# Patient Record
Sex: MALE | Race: ASIAN | NOT HISPANIC OR LATINO
[De-identification: names, ages, dates, MRNs, and addresses within clinical notes are randomized per-mention and may not be internally consistent; named-entity substitution may affect disease eponyms.]

---

## 2018-02-22 ENCOUNTER — CHARTING TRANS (OUTPATIENT)
Dept: OTHER | Age: 48
End: 2018-02-22

## 2018-02-22 ENCOUNTER — IMAGING SERVICES (OUTPATIENT)
Dept: OTHER | Age: 48
End: 2018-02-22

## 2018-02-22 ASSESSMENT — PAIN SCALES - GENERAL: PAINLEVEL_OUTOF10: 4

## 2018-02-23 ENCOUNTER — CHARTING TRANS (OUTPATIENT)
Dept: OTHER | Age: 48
End: 2018-02-23

## 2018-02-26 ENCOUNTER — CHARTING TRANS (OUTPATIENT)
Dept: OTHER | Age: 48
End: 2018-02-26

## 2018-03-16 ENCOUNTER — CHARTING TRANS (OUTPATIENT)
Dept: OTHER | Age: 48
End: 2018-03-16

## 2018-03-30 ENCOUNTER — CHARTING TRANS (OUTPATIENT)
Dept: OTHER | Age: 48
End: 2018-03-30

## 2018-03-30 ENCOUNTER — IMAGING SERVICES (OUTPATIENT)
Dept: OTHER | Age: 48
End: 2018-03-30

## 2018-04-27 ENCOUNTER — IMAGING SERVICES (OUTPATIENT)
Dept: OTHER | Age: 48
End: 2018-04-27

## 2018-04-27 ENCOUNTER — CHARTING TRANS (OUTPATIENT)
Dept: OTHER | Age: 48
End: 2018-04-27

## 2018-06-20 ENCOUNTER — ANCILLARY ORDERS (OUTPATIENT)
Dept: OTHER | Age: 48
End: 2018-06-20

## 2018-06-20 DIAGNOSIS — S92.351D DISPLACED FRACTURE OF FIFTH METATARSAL BONE OF RIGHT FOOT WITH ROUTINE HEALING: ICD-10-CM

## 2019-03-06 VITALS — HEIGHT: 67 IN | BODY MASS INDEX: 31.39 KG/M2 | WEIGHT: 200 LBS

## 2019-03-06 VITALS
DIASTOLIC BLOOD PRESSURE: 76 MMHG | OXYGEN SATURATION: 99 % | TEMPERATURE: 98.4 F | HEART RATE: 78 BPM | SYSTOLIC BLOOD PRESSURE: 124 MMHG | RESPIRATION RATE: 20 BRPM

## 2019-11-14 ENCOUNTER — APPOINTMENT (OUTPATIENT)
Dept: URGENT CARE | Age: 49
End: 2019-11-14

## 2024-09-06 ENCOUNTER — OFFICE VISIT (OUTPATIENT)
Dept: SURGERY | Facility: CLINIC | Age: 54
End: 2024-09-06

## 2024-09-06 DIAGNOSIS — R35.1 NOCTURIA: ICD-10-CM

## 2024-09-06 DIAGNOSIS — N13.8 BPH WITH OBSTRUCTION/LOWER URINARY TRACT SYMPTOMS: ICD-10-CM

## 2024-09-06 DIAGNOSIS — N40.1 BPH WITH OBSTRUCTION/LOWER URINARY TRACT SYMPTOMS: ICD-10-CM

## 2024-09-06 DIAGNOSIS — R82.90 URINE FINDING: Primary | ICD-10-CM

## 2024-09-06 DIAGNOSIS — N40.1 BENIGN PROSTATIC HYPERPLASIA WITH LOWER URINARY TRACT SYMPTOMS, SYMPTOM DETAILS UNSPECIFIED: ICD-10-CM

## 2024-09-06 LAB
APPEARANCE: CLEAR
BILIRUBIN: NEGATIVE
GLUCOSE (URINE DIPSTICK): NEGATIVE MG/DL
KETONES (URINE DIPSTICK): NEGATIVE MG/DL
LEUKOCYTES: NEGATIVE
MULTISTIX LOT#: NORMAL NUMERIC
NITRITE, URINE: NEGATIVE
OCCULT BLOOD: NEGATIVE
PH, URINE: 7 (ref 4.5–8)
PROTEIN (URINE DIPSTICK): NEGATIVE MG/DL
SPECIFIC GRAVITY: 1.01 (ref 1–1.03)
URINE-COLOR: YELLOW
UROBILINOGEN,SEMI-QN: 0.2 MG/DL (ref 0–1.9)

## 2024-09-06 PROCEDURE — 99203 OFFICE O/P NEW LOW 30 MIN: CPT | Performed by: UROLOGY

## 2024-09-06 PROCEDURE — 81003 URINALYSIS AUTO W/O SCOPE: CPT | Performed by: UROLOGY

## 2024-09-06 PROCEDURE — 51798 US URINE CAPACITY MEASURE: CPT | Performed by: UROLOGY

## 2024-09-06 RX ORDER — TAMSULOSIN HYDROCHLORIDE 0.4 MG/1
0.4 CAPSULE ORAL DAILY
COMMUNITY

## 2024-09-06 RX ORDER — CYCLOBENZAPRINE HCL 5 MG
TABLET ORAL
COMMUNITY
Start: 2024-06-28

## 2024-09-06 RX ORDER — ATORVASTATIN CALCIUM 20 MG/1
1 TABLET, FILM COATED ORAL DAILY
COMMUNITY

## 2024-09-06 RX ORDER — AZELASTINE 1 MG/ML
SPRAY, METERED NASAL
COMMUNITY
Start: 2023-12-11

## 2024-09-06 NOTE — PROGRESS NOTES
Urology Clinic Note - New Patient    Referring Provider:  No referring provider defined for this encounter.     Primary Care Provider:  No primary care provider on file.     Chief Complaint:   LUTS    HPI:     Brendon Kumar is a 54 year old male with history of HLD, EDITH on CPAP referred for LUTS.     Patient has a several year history of LUTS.  Daytime frequency, occasional slow stream.  Occasional incomplete emptying.  Has nocturia 2-3 times nightly.  He was given Flomax from his PCP a few months ago, he is unsure if this made a difference and he stopped it.  He does have a father with urinary retention otherwise no family medical history that is pertinent  No smoking.  No gross hematuria, UTI or kidney stone history.  Drinks ~3cups coffee daily.   He does have a history of sleep apnea, he uses his CPAP.    PSA in 4/2024 with PCP was 0.7     PVR37  UA neg      PSA:  No results found for: \"PSA\", \"PERCENTPSA\", \"PSAS\", \"PSAULTRA\", \"QPSA\", \"PSATOT\", \"TOTPSADX\", \"TOTPSASCREEN\"   No Cr or GFR on file.      History:     As above  No surgeries  No smoking   No past medical history on file.    No past surgical history on file.    No family history on file.    Social History     Socioeconomic History    Marital status:        Medications (Active prior to today's visit):  Current Outpatient Medications   Medication Sig Dispense Refill    triamcinolone acetonide 0.1 % External Cream Apply 1 Application topically 2 (two) times daily. Apply to body twice daily 453.6 g 2    hydrocortisone 2.5 % External Cream Apply 1 Application topically 2 (two) times daily. Apply to face, under arms and groin twice daily 30 g 2    Minocycline HCl 100 MG Oral Cap Take 1 capsule (100 mg total) by mouth 2 (two) times daily. 60 capsule 5       Allergies:  No Known Allergies      Review of Systems:   A comprehensive 10-point review of systems was completed.  Pertinent positives and negatives are noted in the the HPI.    Physical Exam:    CONSTITUTIONAL: Well developed, well nourished, in no acute distress  NEUROLOGIC: Alert and oriented  HEAD: Normocephalic, atraumatic  ENT: Hearing intact   RESPIRATORY: Normal respiratory effort  SKIN: No evident rashes  ABDOMEN: Soft, non-tender, non-distended       Assessment & Plan:       Brendon Kumar is a 54 year old male with history of HLD, EDITH on CPAP referred for LUTS.     # LUTS   # Nocturia   Discussed symptoms in detail.  Patient previously tried Flomax, he is unsure if this helped him.  He has mildly elevated PVR.  He does have nocturia, we discussed behavioral modification with less coffee and voiding before bedtime.  Discussed ongoing use of CPAP.  We discussed trial of additional medication with finasteride pending prostate size, possible OAB medication.  We also discussed possibly trying Flomax again.  For now, he really wants to hold off on medications.  We did discuss that a BPH procedure would be an option.  We will obtain further imaging, cystoscopy to evaluate his anatomy.  At that time we will discuss surgical options.      Thank you for this consult.    I have personally reviewed all relevant medical records, labs, and imaging.       Lon Casey MD  Staff Urologist  [facility]  Office: [phone]

## 2024-09-20 ENCOUNTER — HOSPITAL ENCOUNTER (OUTPATIENT)
Dept: CT IMAGING | Facility: HOSPITAL | Age: 54
Discharge: HOME | End: 2024-09-20
Attending: UROLOGY
Payer: COMMERCIAL

## 2024-09-20 DIAGNOSIS — N40.1 BPH WITH OBSTRUCTION/LOWER URINARY TRACT SYMPTOMS: ICD-10-CM

## 2024-09-20 DIAGNOSIS — N13.8 BPH WITH OBSTRUCTION/LOWER URINARY TRACT SYMPTOMS: ICD-10-CM

## 2024-09-20 PROCEDURE — 74176 CT ABD & PELVIS W/O CONTRAST: CPT | Performed by: UROLOGY

## 2024-12-02 ENCOUNTER — TELEPHONE (OUTPATIENT)
Dept: SURGERY | Facility: CLINIC | Age: 54
End: 2024-12-02

## 2024-12-04 ENCOUNTER — TELEPHONE (OUTPATIENT)
Dept: SURGERY | Facility: CLINIC | Age: 54
End: 2024-12-04

## 2024-12-04 NOTE — TELEPHONE ENCOUNTER
Patient called back and appt scheduled for 1/30/25.  
Per patient asking to reschedule procedure that is scheduled today. Please advise.  
Tried to reach patient at both phone numbers in chart.  No answer.  
Kermit Redding)

## 2024-12-16 ENCOUNTER — TELEPHONE (OUTPATIENT)
Dept: SURGERY | Facility: CLINIC | Age: 54
End: 2024-12-16

## 2024-12-16 NOTE — TELEPHONE ENCOUNTER
Per patient was calling requesting an earlier cytoscopy appointment, preferably rescheduled to one in 2024. Please call back as soon as possible. Thank you!

## 2024-12-29 NOTE — PROGRESS NOTES
Clinic Procedure Note    INDICATIONS:     Brendon Kumar is a 54 year old male with history of HLD, EDITH on CPAP referred for LUTS.      Patient has a several year history of LUTS.  Daytime frequency, occasional slow stream.  Occasional incomplete emptying.  Has nocturia 2-3 times nightly.  He was given Flomax from his PCP a few months ago, he is unsure if this made a difference and he stopped it.  He does have a father with urinary retention otherwise no family medical history that is pertinent  No smoking.  No gross hematuria, UTI or kidney stone history.  Drinks ~3cups coffee daily.   He does have a history of sleep apnea, he uses his CPAP.     PSA in 2024 with PCP was 0.7   [id number]    At patient's initial visit, patient wanted to hold off on medications.  We had briefly discussed retrial of Flomax, addition of finasteride, OAB medication trial.  We discussed cystoscopy to evaluate further as well as imaging.  He now presents for cystoscopy.  CT scan as below.      CT AP on 24;   normal  29cc gland on my review       PROCEDURE:  Attempted cystoscopy - unable to perform due to below.   DATE OF PROCEDURE: 2024   PRE-PROCEDURE DIAGNOSIS: LUTS  POST-PROCEDURE DIAGNOSIS: Same  SURGEON: Lon Casey MD    FINDINGS:  Patient noted to have hypospadias findings with dorsal hooded foreskin and distal hypospadias with wide urethral plate and glandular meatus, however this was pinpoint and could not be probed with scope. I offered patient wire placement, attempts at dilation vs referral to reconstructive urologist for definitive repair given his symptoms; he preferred definitive repair.     PROCEDURE:   Patient was brought to the procedure suite and a time-out was performed identifiying the patient,  and procedure to be performed. The risks and benefits of the procedure were once again discussed with the patient including bleeding, infection, and dysuria. The patient agreed to proceed. The patient did not have  any signs or symptoms of active UTI.    He was placed in supine position on the table and the penis was prepped and draped in the standard sterile fashion. Urojet was instilled per urethra for local anesthetic effect. I attempted to place a flexible cystoscope in the urethra but could not cannulate due to above findings. Procedure aborted      IMPRESSION AND PLAN:     LUTS  Exam today with hypospadias with extremely narrow meatus. Given low PVR and no infections no urgent intervention indicated; patient did not want urethral dilation attempts today.  He does note today that he occasionally has very narrow and sprayed stream, however other times he had a strong stream. He has never previously been told that he had an anatomic abnormality.   I think he would do well with a more formal reconstruction/distal urethroplasty for his hypospadias variant.     No procedure completed today.   In total, 30 minutes were spent on this patient encounter (including chart review, patient history, physical, and counseling, documentation, and communication).      Lon Casey MD  Staff Urologist  [facility]  Office: [phone]

## 2024-12-31 ENCOUNTER — PROCEDURE (OUTPATIENT)
Dept: SURGERY | Facility: CLINIC | Age: 54
End: 2024-12-31

## 2024-12-31 DIAGNOSIS — N13.8 BPH WITH OBSTRUCTION/LOWER URINARY TRACT SYMPTOMS: ICD-10-CM

## 2024-12-31 DIAGNOSIS — N40.1 BPH WITH OBSTRUCTION/LOWER URINARY TRACT SYMPTOMS: ICD-10-CM

## 2024-12-31 DIAGNOSIS — R82.90 URINE FINDING: Primary | ICD-10-CM

## 2024-12-31 LAB
APPEARANCE: CLEAR
BILIRUBIN: NEGATIVE
GLUCOSE (URINE DIPSTICK): NEGATIVE MG/DL
KETONES (URINE DIPSTICK): NEGATIVE MG/DL
LEUKOCYTES: NEGATIVE
MULTISTIX LOT#: ABNORMAL NUMERIC
NITRITE, URINE: NEGATIVE
PH, URINE: 6.5 (ref 4.5–8)
PROTEIN (URINE DIPSTICK): NEGATIVE MG/DL
SPECIFIC GRAVITY: 1.01 (ref 1–1.03)
URINE-COLOR: YELLOW
UROBILINOGEN,SEMI-QN: 0.2 MG/DL (ref 0–1.9)

## 2024-12-31 PROCEDURE — 99214 OFFICE O/P EST MOD 30 MIN: CPT | Performed by: UROLOGY

## 2024-12-31 PROCEDURE — 81003 URINALYSIS AUTO W/O SCOPE: CPT | Performed by: UROLOGY

## 2024-12-31 RX ORDER — SULFAMETHOXAZOLE AND TRIMETHOPRIM 800; 160 MG/1; MG/1
1 TABLET ORAL ONCE
Status: COMPLETED | OUTPATIENT
Start: 2024-12-31 | End: 2024-12-31

## 2024-12-31 RX ADMIN — SULFAMETHOXAZOLE AND TRIMETHOPRIM 1 TABLET: 800; 160 TABLET ORAL at 09:16:00

## 2025-05-22 ENCOUNTER — HOSPITAL ENCOUNTER (OUTPATIENT)
Dept: GENERAL RADIOLOGY | Age: 55
Discharge: HOME | End: 2025-05-22
Attending: FAMILY MEDICINE
Payer: COMMERCIAL

## 2025-05-22 DIAGNOSIS — M54.9 ACUTE BACK PAIN: ICD-10-CM

## 2025-05-22 PROCEDURE — 72110 X-RAY EXAM L-2 SPINE 4/>VWS: CPT | Performed by: FAMILY MEDICINE
